# Patient Record
Sex: FEMALE
[De-identification: names, ages, dates, MRNs, and addresses within clinical notes are randomized per-mention and may not be internally consistent; named-entity substitution may affect disease eponyms.]

---

## 2021-02-13 ENCOUNTER — NURSE TRIAGE (OUTPATIENT)
Dept: OTHER | Facility: CLINIC | Age: 56
End: 2021-02-13

## 2021-02-13 NOTE — TELEPHONE ENCOUNTER
Brief description of triage: Pt calls in stating she believes she has a UTI d/t painful urination, frequency, mild low back pain. Triage indicates for patient to See HCP within 4 hours. Pt advised to proceed to [facility]. Pt inquires about virtual option. Pt advised of [employer] website and pt agreeable with plan. Care advice provided, patient verbalizes understanding; denies any other questions or concerns; instructed to call back for any new or worsening symptoms. This triage is a result of a call to [address] [place] [address]. Please do not respond to the triage nurse through this encounter. Any subsequent communication should be directly with the patient. Reason for Disposition   Side (flank) or lower back pain present    Answer Assessment - Initial Assessment Questions  1. SEVERITY: \"How bad is the pain? \"  (e.g., Scale 1-10; mild, moderate, or severe)    - MILD (1-3): complains slightly about urination hurting    - MODERATE (4-7): interferes with normal activities      - SEVERE (8-10): excruciating, unwilling or unable to urinate because of the pain       Severe    2. FREQUENCY: \"How many times have you had painful urination today? \"       Very frequent    3. PATTERN: \"Is pain present every time you urinate or just sometimes? \"       Each time pt urinates    4. ONSET: \"When did the painful urination start? \"       Not asked    5. FEVER: \"Do you have a fever? \" If so, ask: \"What is your temperature, how was it measured, and when did it start? \"      Denies    6. PAST UTI: \"Have you had a urine infection before? \" If so, ask: \"When was the last time? \" and \"What happened that time? \"       Many times in the past    7. CAUSE: \"What do you think is causing the painful urination? \"  (e.g., UTI, scratch, Herpes sore)      UTI    8. OTHER SYMPTOMS: \"Do you have any other symptoms? \" (e.g., flank pain, vaginal discharge, genital sores, urgency, blood in urine)      Denies blood in urine, states mild low back pain    9. PREGNANCY: \"Is there any chance you are pregnant? \" \"When was your last menstrual period? \"      Not asked    Protocols used: URINATION PAIN [facility]